# Patient Record
Sex: FEMALE
[De-identification: names, ages, dates, MRNs, and addresses within clinical notes are randomized per-mention and may not be internally consistent; named-entity substitution may affect disease eponyms.]

---

## 2018-06-05 PROBLEM — Z00.00 ENCOUNTER FOR PREVENTIVE HEALTH EXAMINATION: Status: ACTIVE | Noted: 2018-06-05

## 2018-07-05 ENCOUNTER — APPOINTMENT (OUTPATIENT)
Dept: UROLOGY | Facility: CLINIC | Age: 68
End: 2018-07-05
Payer: MEDICARE

## 2018-07-05 VITALS
DIASTOLIC BLOOD PRESSURE: 70 MMHG | BODY MASS INDEX: 25.1 KG/M2 | SYSTOLIC BLOOD PRESSURE: 114 MMHG | WEIGHT: 147 LBS | HEIGHT: 64 IN | HEART RATE: 74 BPM

## 2018-07-05 DIAGNOSIS — Z80.8 FAMILY HISTORY OF MALIGNANT NEOPLASM OF OTHER ORGANS OR SYSTEMS: ICD-10-CM

## 2018-07-05 DIAGNOSIS — Z78.9 OTHER SPECIFIED HEALTH STATUS: ICD-10-CM

## 2018-07-05 DIAGNOSIS — E78.5 HYPERLIPIDEMIA, UNSPECIFIED: ICD-10-CM

## 2018-07-05 DIAGNOSIS — Z82.49 FAMILY HISTORY OF ISCHEMIC HEART DISEASE AND OTHER DISEASES OF THE CIRCULATORY SYSTEM: ICD-10-CM

## 2018-07-05 DIAGNOSIS — I10 ESSENTIAL (PRIMARY) HYPERTENSION: ICD-10-CM

## 2018-07-05 DIAGNOSIS — Z86.39 PERSONAL HISTORY OF OTHER ENDOCRINE, NUTRITIONAL AND METABOLIC DISEASE: ICD-10-CM

## 2018-07-05 DIAGNOSIS — Z83.3 FAMILY HISTORY OF DIABETES MELLITUS: ICD-10-CM

## 2018-07-05 PROCEDURE — 99202 OFFICE O/P NEW SF 15 MIN: CPT

## 2018-07-05 RX ORDER — IRBESARTAN AND HYDROCHLOROTHIAZIDE 150; 12.5 MG/1; MG/1
150-12.5 TABLET ORAL
Qty: 90 | Refills: 0 | Status: COMPLETED | COMMUNITY
Start: 2018-05-01

## 2018-07-05 RX ORDER — SIMVASTATIN 20 MG/1
20 TABLET, FILM COATED ORAL
Refills: 0 | Status: ACTIVE | COMMUNITY

## 2018-07-05 RX ORDER — IRBESARTAN AND HYDROCHLOROTHIAZIDE 150; 12.5 MG/1; MG/1
150-12.5 TABLET ORAL
Refills: 0 | Status: ACTIVE | COMMUNITY

## 2018-07-05 RX ORDER — SIMVASTATIN 40 MG/1
40 TABLET, FILM COATED ORAL
Qty: 45 | Refills: 0 | Status: COMPLETED | COMMUNITY
Start: 2018-04-24

## 2018-07-05 NOTE — LETTER HEADER
[FreeTextEntry3] : Lobito Law M.D.\par Director of Urology\par [facility]\par [address] [place] [address], [address]\[place] [address]

## 2018-07-05 NOTE — PHYSICAL EXAM
[General Appearance - Well Developed] : well developed [General Appearance - Well Nourished] : well nourished [Normal Appearance] : normal appearance [Well Groomed] : well groomed [General Appearance - In No Acute Distress] : no acute distress [Edema] : no peripheral edema [Respiration, Rhythm And Depth] : normal respiratory rhythm and effort [Exaggerated Use Of Accessory Muscles For Inspiration] : no accessory muscle use [Auscultation Breath Sounds / Voice Sounds] : lungs were clear to auscultation bilaterally [Abdomen Soft] : soft [Abdomen Tenderness] : non-tender [] : no hepato-splenomegaly [Costovertebral Angle Tenderness] : no ~M costovertebral angle tenderness [Normal Station and Gait] : the gait and station were normal for the patient's age [No Focal Deficits] : no focal deficits [Oriented To Time, Place, And Person] : oriented to person, place, and time [Affect] : the affect was normal [Mood] : the mood was normal [Not Anxious] : not anxious

## 2018-07-05 NOTE — HISTORY OF PRESENT ILLNESS
[Urinary Incontinence] : urinary incontinence [Urinary Urgency] : urinary urgency [Hematuria - Microscopic] : microscopic hematuria [FreeTextEntry1] : Cori is a very pleasant 67-year-old female 11 days from her 68th birthday who other than occasional urge incontinence if she delays urination feels fine from urologic aspect. She is referred for urologic consultation as she said persistent microhematuria in specific on urine samples done as far back as March 16, 2017, September 25, 2017, April 2, 2018 and then May 10, 2018. She has chronic kidney disease stage III with an acceptable BUN and creatinine on the most recent labs and when queried tells me that in fact her samples for the most part one non-clean-catch.

## 2018-07-05 NOTE — ASSESSMENT
[FreeTextEntry1] : Her exam excluding a pelvic exam was benign. She tells me she was the 104 pounds in the last year though she’s done that before this time she hopes not to go back up. She walks at least three hours a day not a fast pace but if you doing three hours a day you don’t have to and feels fine except for the occasional urge incontinence.

## 2018-07-05 NOTE — LETTER BODY
[Dear  ___] : Dear  [unfilled], [Consult Letter:] : I had the pleasure of evaluating your patient, [unfilled]. [Please see my note below.] : Please see my note below. [Consult Closing:] : Thank you very much for allowing me to participate in the care of this patient.  If you have any questions, please do not hesitate to contact me. [Sincerely,] : Sincerely, [DrGenoveva  ___] : Dr. MOROCHO [FreeTextEntry2] : Myron Salas MD\par [address]\[place] [address]\par

## 2018-09-05 ENCOUNTER — APPOINTMENT (OUTPATIENT)
Dept: UROLOGY | Facility: CLINIC | Age: 68
End: 2018-09-05
Payer: MEDICARE

## 2018-09-05 VITALS
HEIGHT: 64 IN | SYSTOLIC BLOOD PRESSURE: 114 MMHG | DIASTOLIC BLOOD PRESSURE: 66 MMHG | BODY MASS INDEX: 25.1 KG/M2 | WEIGHT: 147 LBS | HEART RATE: 72 BPM

## 2018-09-05 DIAGNOSIS — N18.3 CHRONIC KIDNEY DISEASE, STAGE 3 (MODERATE): ICD-10-CM

## 2018-09-05 DIAGNOSIS — R31.29 OTHER MICROSCOPIC HEMATURIA: ICD-10-CM

## 2018-09-05 DIAGNOSIS — R39.15 URGENCY OF URINATION: ICD-10-CM

## 2018-09-05 LAB
BILIRUB UR QL STRIP: NORMAL
CLARITY UR: CLEAR
COLLECTION METHOD: NORMAL
GLUCOSE UR-MCNC: NORMAL
HCG UR QL: NORMAL EU/DL
HGB UR QL STRIP.AUTO: NORMAL
KETONES UR-MCNC: NORMAL
LEUKOCYTE ESTERASE UR QL STRIP: NORMAL
NITRITE UR QL STRIP: NORMAL
PH UR STRIP: 6
PROT UR STRIP-MCNC: NORMAL
SP GR UR STRIP: 1.01

## 2018-09-05 PROCEDURE — 81003 URINALYSIS AUTO W/O SCOPE: CPT | Mod: QW

## 2018-09-05 PROCEDURE — 51741 ELECTRO-UROFLOWMETRY FIRST: CPT

## 2018-09-05 PROCEDURE — 51798 US URINE CAPACITY MEASURE: CPT

## 2018-09-05 PROCEDURE — 99213 OFFICE O/P EST LOW 20 MIN: CPT | Mod: 25

## 2018-09-05 NOTE — ASSESSMENT
[FreeTextEntry1] : Her uroflow shows excellent force of stream without any PVR. Her I/Os show increased fluid intake. We discussed all the options, including decreasing PO fluid, and bladder paralytics, but given her volume that is not a great option\par she is electing for observation only.\par With regards to her microscopic hematuria, there has been no evidence at this time. She will continue follow up with nephrology.

## 2018-09-05 NOTE — LETTER HEADER
[FreeTextEntry3] : Lobito Law M.D.\par Director of Urology\par [facility]\par [address] [place] [address], [address]\[place] [address]

## 2018-09-05 NOTE — LETTER BODY
[Dear  ___] : Dear ~SHANNON, [Courtesy Letter:] : I had the pleasure of seeing your patient, [unfilled], in my office today. [Please see my note below.] : Please see my note below. [Sincerely,] : Sincerely, [DrGenoveva  ___] : Dr. MOROCHO [FreeTextEntry2] : Myron Salas MD\par [address]\[place] [address]\par

## 2018-09-05 NOTE — HISTORY OF PRESENT ILLNESS
[Urinary Incontinence] : urinary incontinence [Urinary Frequency] : urinary frequency [None] : None [FreeTextEntry1] : Cori is a 67 year old female, with a long history of microscopic hematuria and CKD III. She does have some urinary frequency and urge incontinence. She presents today for results of her uroflow testing and sequential UAs.